# Patient Record
Sex: FEMALE | Race: OTHER | NOT HISPANIC OR LATINO | URBAN - METROPOLITAN AREA
[De-identification: names, ages, dates, MRNs, and addresses within clinical notes are randomized per-mention and may not be internally consistent; named-entity substitution may affect disease eponyms.]

---

## 2022-02-21 ENCOUNTER — EMERGENCY (EMERGENCY)
Facility: HOSPITAL | Age: 27
LOS: 1 days | Discharge: HOME | End: 2022-02-21
Admitting: EMERGENCY MEDICINE
Payer: COMMERCIAL

## 2022-02-21 VITALS
RESPIRATION RATE: 19 BRPM | SYSTOLIC BLOOD PRESSURE: 105 MMHG | TEMPERATURE: 98 F | HEIGHT: 61 IN | DIASTOLIC BLOOD PRESSURE: 65 MMHG | OXYGEN SATURATION: 100 % | HEART RATE: 91 BPM | WEIGHT: 121.03 LBS

## 2022-02-21 PROCEDURE — 99282 EMERGENCY DEPT VISIT SF MDM: CPT

## 2022-02-21 PROCEDURE — 99283 EMERGENCY DEPT VISIT LOW MDM: CPT

## 2022-02-21 NOTE — ED ADULT TRIAGE NOTE - CHIEF COMPLAINT QUOTE
Patient c/o bump to right lower lip x 4 months, getting progressively worse & more painful.  "I went to other emergency rooms and they won't help me."  Patient states she accidentally bit her lip 4 months ago.

## 2022-02-21 NOTE — ED PROVIDER NOTE - NSFOLLOWUPINSTRUCTIONS_ED_ALL_ED_FT
Take tylenol 650mg or motrin 400-800mg as needed every 4-6 hours for pain.       Please follow up in one of the dental clinics given.       Canker Sores    WHAT YOU NEED TO KNOW:    Canker sores are small ulcers that develop inside your mouth. Ulcers are open sores that may be shallow or deep. You may have one or more sores at a time, and they may grow in clusters.     DISCHARGE INSTRUCTIONS:    Return to the emergency department if:   •You cannot eat or drink because of your mouth pain.          Contact your healthcare provider if:   •Your canker sores are not gone after 3 to 4 weeks.      •Your pain does not go away after you take medicines.      •Your sores are getting worse or you are getting more sores, even after treatment.       •You have questions or concerns about your condition or care.      Medicines:You may need any of the following:   •Pain medicine may be given as a cream, gel, or mouthwash.      •Steroid medicine may be given to decrease redness, swelling, and pain.      •Take your medicine as directed. Contact your healthcare provider if you think your medicine is not helping or if you have side effects. Tell him or her if you are allergic to any medicine. Keep a list of the medicines, vitamins, and herbs you take. Include the amounts, and when and why you take them. Bring the list or the pill bottles to follow-up visits. Carry your medicine list with you in case of an emergency.      Follow up with your doctor as directed: Write down your questions so you remember to ask them during your visits.     Eat soft, plain foods until your canker sores heal: Examples include yogurt, eggs, and creamy soups. You may need to change some foods you usually eat. Do not have crunchy, dry, or salty foods, such as dry toast, popcorn, or chips. These can cause pain. Do not have foods or drinks that contain citric acid, such as grapefruit, orange juice, reyna, and limes. These may make your pain worse or cause more sores to form.     Mouth care: Gently brush your teeth and tongue every day. Use a soft toothbrush. If you have dentures, clean them every day. If your braces or dentures do not feel comfortable, have a dentist check them to see that they fit well.

## 2022-02-21 NOTE — ED PROVIDER NOTE - OBJECTIVE STATEMENT
27 F denies pmh p/w mouth discomfort x 4 months.  reports small area of swelling to inferior inner lip, waxes/wanes in size and becomes somewhat painful when size increases.  [facility] and referred to dentist but reports unable to get appointment.  Reports accidently bit area of swelling few days ago exacerbating pain.  Denies f/c, headache, dental pain, sore throat, rhinorrhea, earache, difficulty swallowing, neck pain, nv, trauma.

## 2022-02-21 NOTE — ED ADULT NURSE NOTE - OBJECTIVE STATEMENT
Pt presents to ED C/O lip abscess starting "about 4 months ago". C/O pain and swelling. Denies discharge, bleeding to lip, fevers. No airway compromise at this time.

## 2022-02-21 NOTE — ED PROVIDER NOTE - PATIENT PORTAL LINK FT
You can access the FollowMyHealth Patient Portal offered by [facility] by registering at the following website: [email or web address]. By joining [employer]s FollowMyHealth portal, you will also be able to view your health information using other applications (apps) compatible with our system.

## 2022-02-21 NOTE — ED PROVIDER NOTE - PHYSICAL EXAMINATION
Gen: well appearing, no acute distress  Skin: warm/dry, no rash noted  HEENT: ncat, eomi, perrla, uvula midline no tonsillar edema/exudate, no soft palate edema, dentition intact, small aphthous ulcer inferior gum w/ localized swelling, no induration/fluctuance, no ttp   Neck: supple, no LAD   Resp: breathing comfortably, speaking in full sentences, no dyspnea  Neuro: alert/oriented, ambulatory

## 2022-02-21 NOTE — ED PROVIDER NOTE - CLINICAL SUMMARY MEDICAL DECISION MAKING FREE TEXT BOX
27 F denies pmh p/w mouth discomfort x 4 months. on exam VSS, afebrile, HEENT: ncat, eomi, perrla, uvula midline no tonsillar edema/exudate, no soft palate edema, dentition intact, small aphthous ulcer inferior gum w/ localized swelling, no induration/fluctuance, no ttp.  appears to be aphthous ulcer w/ inflammatory changes, does not appear infection/abscess.  educated on dental hygiene and f/u in dental clinic, list of options given.  discussed strict return parameters

## 2022-02-24 DIAGNOSIS — K13.79 OTHER LESIONS OF ORAL MUCOSA: ICD-10-CM
